# Patient Record
Sex: FEMALE | Race: WHITE | URBAN - METROPOLITAN AREA
[De-identification: names, ages, dates, MRNs, and addresses within clinical notes are randomized per-mention and may not be internally consistent; named-entity substitution may affect disease eponyms.]

---

## 2024-02-05 ENCOUNTER — OFFICE VISIT (OUTPATIENT)
Dept: URGENT CARE | Facility: CLINIC | Age: 64
End: 2024-02-05
Payer: COMMERCIAL

## 2024-02-05 VITALS
OXYGEN SATURATION: 98 % | RESPIRATION RATE: 20 BRPM | WEIGHT: 150 LBS | BODY MASS INDEX: 28.34 KG/M2 | DIASTOLIC BLOOD PRESSURE: 83 MMHG | TEMPERATURE: 97.5 F | SYSTOLIC BLOOD PRESSURE: 154 MMHG | HEART RATE: 92 BPM

## 2024-02-05 DIAGNOSIS — J04.0 LARYNGITIS: ICD-10-CM

## 2024-02-05 DIAGNOSIS — J01.00 ACUTE MAXILLARY SINUSITIS, RECURRENCE NOT SPECIFIED: Primary | ICD-10-CM

## 2024-02-05 PROCEDURE — 99213 OFFICE O/P EST LOW 20 MIN: CPT | Performed by: FAMILY MEDICINE

## 2024-02-05 PROCEDURE — 1036F TOBACCO NON-USER: CPT | Performed by: FAMILY MEDICINE

## 2024-02-05 RX ORDER — CIPROFLOXACIN 500 MG/1
TABLET ORAL
COMMUNITY
Start: 2024-01-26

## 2024-02-05 RX ORDER — GABAPENTIN 300 MG/1
CAPSULE ORAL
COMMUNITY
Start: 2024-01-26

## 2024-02-05 RX ORDER — METHYLPREDNISOLONE 4 MG/1
TABLET ORAL
Qty: 21 TABLET | Refills: 0 | Status: SHIPPED | OUTPATIENT
Start: 2024-02-05

## 2024-02-05 RX ORDER — OXYCODONE AND ACETAMINOPHEN 5; 325 MG/1; MG/1
TABLET ORAL
COMMUNITY
Start: 2024-01-25

## 2024-02-05 RX ORDER — ZOLPIDEM TARTRATE 10 MG/1
10 TABLET ORAL NIGHTLY PRN
COMMUNITY

## 2024-02-05 RX ORDER — AMOXICILLIN 875 MG/1
875 TABLET, FILM COATED ORAL 2 TIMES DAILY
Qty: 14 TABLET | Refills: 0 | Status: SHIPPED | OUTPATIENT
Start: 2024-02-05 | End: 2024-02-12

## 2024-02-05 RX ORDER — ALUMINUM CHLORIDE 20 %
SOLUTION, NON-ORAL TOPICAL
COMMUNITY
Start: 2023-10-24

## 2024-02-05 RX ORDER — LORAZEPAM 2 MG/1
2 TABLET ORAL DAILY PRN
COMMUNITY

## 2024-02-05 RX ORDER — HYDROCODONE BITARTRATE AND ACETAMINOPHEN 5; 325 MG/1; MG/1
TABLET ORAL
COMMUNITY
Start: 2024-01-05

## 2024-02-05 RX ORDER — BUPROPION HYDROCHLORIDE 300 MG/1
TABLET ORAL
COMMUNITY

## 2024-02-05 ASSESSMENT — ENCOUNTER SYMPTOMS
RHINORRHEA: 1
TROUBLE SWALLOWING: 1
NAUSEA: 0
SORE THROAT: 1
SINUS PRESSURE: 1
DIARRHEA: 0
CHILLS: 0
DYSURIA: 0
FEVER: 0
VOMITING: 0
CONSTIPATION: 0
FREQUENCY: 0
ABDOMINAL PAIN: 0
SHORTNESS OF BREATH: 0
CHEST TIGHTNESS: 0
WHEEZING: 0
PALPITATIONS: 0
SINUS COMPLAINT: 1
HEADACHES: 1
COUGH: 1

## 2024-02-06 NOTE — PATIENT INSTRUCTIONS
You have ?? sinusitis. This can be a bacterial or viral infection. Based on your history and the clinical exam I am treating this as a bacterial infection.  Please increase your oral fluids for the next 7-10 days  Please take amoxicillin as prescribed  Please take Medrol as a single dose early in the day with food  May use Mucinex as per label instructions for nasal congestion  You may use nasal saline drops for relief of congestion as needed  You may mix 1 teaspoon of table salt with 8 ounces of warm water to rinse and gargle your sore throat.  You may do this repeatedly for up to 15 minutes if it seems to relieve your discomfort.  Do not swallow this liquid  May use cough lozenges or hard candies to soothe throat and reduce daytime coughing  May take Tylenol (acetaminophen) 325 mg, 2 tablets by mouth every 4-6 hours as needed for fever or discomfort. May take Motrin or Advil (ibuprofen) 200 mg, 2 tablets by mouth every 8 hours as needed for fever   If no improvement in 5-7 days please follow-up with your primary care provider  If fever greater than 102 degrees Fahrenheit, chills, nausea, vomiting, increased redness, tenderness, pain over for head or cheek bone areas, bloody nasal discharge, increased difficulty breathing, difficulty swallowing, increased wheezing, shortness of breath please go immediately to emergency room for further evaluation  This note was generated by voice recognition software. Minor transcription/grammatical errors may be present. Please call for clarification.

## 2024-02-06 NOTE — PROGRESS NOTES
"Subjective   Patient ID: Betty Fair is a 63 y.o. female.    63-year-old  female presents with a 4-day history of respiratory symptoms including nasal congestion and drainage.  She today reports that she has developed a significant laryngitis.  She states it feels like she has got a ball in her throat.  She reports that she takes care of her  granddaughter who as she puts it \"has been sick for the last year\".  Also reports that her son has been ill in the last 10 days.      History provided by:  Patient  Sinus Problem  Severity:  Moderate  Onset quality:  Gradual  Duration:  4 days  Timing:  Constant  Progression:  Worsening  Chronicity:  New  Associated symptoms: congestion, cough, headaches, rhinorrhea and sore throat    Associated symptoms: no abdominal pain, no diarrhea, no ear pain, no fever, no nausea, no shortness of breath, no vomiting and no wheezing        The following portions of the chart were reviewed this encounter and updated as appropriate:  Tobacco  Allergies  Meds  Problems  Med Hx  Surg Hx  Fam Hx         Review of Systems   Constitutional:  Negative for chills and fever.   HENT:  Positive for congestion, rhinorrhea, sinus pressure, sore throat and trouble swallowing. Negative for ear pain.    Respiratory:  Positive for cough. Negative for chest tightness, shortness of breath and wheezing.    Cardiovascular:  Negative for palpitations.   Gastrointestinal:  Negative for abdominal pain, constipation, diarrhea, nausea and vomiting.   Genitourinary:  Negative for dysuria and frequency.   Neurological:  Positive for headaches.     Objective   Physical Exam  Vital signs are reviewed.  Blood pressure elevated at 154/83.    Alert and oriented x3 with normal mood and affect  Patient is well nourished, well-developed, alert and in no acute distress  Tender to palpation over frontal and maxillary sinus areas    External eyes, orbits, conjunctiva and eyelids are normal in " appearance  Pupils are equal, round, reactive to light and accommodation, extraocular movements intact    External ears appear normal  External canals are normal in appearance  Right tympanic membrane is intact and dull pink in appearance  Left tympanic membrane is intact and pale pink in appearance  There is no middle ear effusion noted on the right  There is no middle ear effusion noted on the left  External appearance of the nose is normal  Nasal mucosa, septum, turbinates are moderately reddened and mildly swollen in appearance.  Significant excoriation in both nares.  There is thin yellow nasal discharge in both nares    Oral mucosa is uniformly pink and moist  Palate is pink, symmetric and intact  Tongue is moist, mobile and midline  Tonsils are present, not enlarged, not erythematous with no concretions or exudates present  No cervical lymphadenopathy palpated    Heart has regular rate and rhythm. No murmurs, rubs or gallops are auscultated at this exam.    Respiratory rate rhythm and effort are normal. Breath sounds bilaterally are clear on auscultation without crackles, rhonchi, wheezes or friction rub.    Abdomen: Normal bowel sounds on auscultation. Soft, nontender without rebound or rigidity on palpation          Procedures    Assessment/Plan   Diagnoses and all orders for this visit:  Acute maxillary sinusitis, recurrence not specified  -     methylPREDNISolone (Medrol Dospak) 4 mg tablets; Please take one row of tabs early in the day with food each day  -     amoxicillin (Amoxil) 875 mg tablet; Take 1 tablet (875 mg) by mouth 2 times a day for 7 days.  Laryngitis

## 2024-02-09 ENCOUNTER — OFFICE VISIT (OUTPATIENT)
Dept: URGENT CARE | Facility: CLINIC | Age: 64
End: 2024-02-09
Payer: COMMERCIAL

## 2024-02-09 VITALS
BODY MASS INDEX: 28.34 KG/M2 | SYSTOLIC BLOOD PRESSURE: 154 MMHG | HEART RATE: 61 BPM | DIASTOLIC BLOOD PRESSURE: 88 MMHG | RESPIRATION RATE: 16 BRPM | TEMPERATURE: 97.4 F | OXYGEN SATURATION: 98 % | WEIGHT: 150 LBS

## 2024-02-09 DIAGNOSIS — H65.01 RIGHT ACUTE SEROUS OTITIS MEDIA, RECURRENCE NOT SPECIFIED: Primary | ICD-10-CM

## 2024-02-09 DIAGNOSIS — H69.91 EUSTACHIAN TUBE DYSFUNCTION, RIGHT: ICD-10-CM

## 2024-02-09 DIAGNOSIS — J20.9 ACUTE BRONCHITIS WITH BRONCHOSPASM: ICD-10-CM

## 2024-02-09 DIAGNOSIS — J01.00 ACUTE MAXILLARY SINUSITIS, RECURRENCE NOT SPECIFIED: ICD-10-CM

## 2024-02-09 PROCEDURE — 1036F TOBACCO NON-USER: CPT | Performed by: FAMILY MEDICINE

## 2024-02-09 PROCEDURE — 99214 OFFICE O/P EST MOD 30 MIN: CPT | Performed by: FAMILY MEDICINE

## 2024-02-09 RX ORDER — ALBUTEROL SULFATE 90 UG/1
AEROSOL, METERED RESPIRATORY (INHALATION)
Qty: 6.7 G | Refills: 0 | Status: SHIPPED | OUTPATIENT
Start: 2024-02-09

## 2024-02-09 RX ORDER — INHALER, ASSIST DEVICES
SPACER (EA) MISCELLANEOUS
Qty: 1 EACH | Refills: 0 | Status: SHIPPED | OUTPATIENT
Start: 2024-02-09

## 2024-02-09 RX ORDER — CEFDINIR 300 MG/1
300 CAPSULE ORAL 2 TIMES DAILY
Qty: 20 CAPSULE | Refills: 0 | Status: SHIPPED | OUTPATIENT
Start: 2024-02-09 | End: 2024-02-19

## 2024-02-09 ASSESSMENT — ENCOUNTER SYMPTOMS
FEVER: 0
WHEEZING: 1
PALPITATIONS: 0
FREQUENCY: 0
ABDOMINAL PAIN: 0
NAUSEA: 0
SINUS PRESSURE: 0
DYSURIA: 0
SORE THROAT: 1
RHINORRHEA: 0
SHORTNESS OF BREATH: 0
CHEST TIGHTNESS: 0
CHILLS: 0
DIARRHEA: 0
COUGH: 1
VOMITING: 0
CONSTIPATION: 0
HEADACHES: 1

## 2024-02-09 ASSESSMENT — PAIN SCALES - GENERAL: PAINLEVEL: 8

## 2024-02-09 NOTE — PATIENT INSTRUCTIONS
You have right acute serous otitis media or middle ear infection. You Maxillary sinus infection.  You have right eustachian tube dysfunction.  You have acute bronchitis with bronchospasm  Please increase your oral fluids for the next 7-10 days  Please take Cefdinir as prescribed  I recommend the use of probiotics while taking antibiotic and for an additional 7-10 days after you've completed treatment. Please ask your pharmacist for advice on appropriate product for this purpose  May use Allegra-D or Zyrtec-D for relief of ear congestion.  Ask pharmacist for these medications  If no better in 5-7 days please follow-up with primary care provider.   If fever greater than 102 degrees Fahrenheit, chills, nausea, vomiting, bleeding from ears, drainage from ears, increased difficulty breathing, difficulty swallowing, increased wheezing, shortness of breath please go immediately to emergency room for further evaluation.      You have an upper respiratory infection with cough and acute bronchitis with bronchospasm  Use inhaler with spacer device 2 puffs every 4-6 hours for the next 7-10 days, decrease frequency of use as symptoms improve. Please take 5-6 deep breaths after activating inhaler from spacer chamber. Pause for approximately 1-2 minutes.  Repeat activation of inhaler and 5-6 deep breaths from spacer.  Increase oral fluids for the next 7-10 days  May use ibuprofen 200mg, 2 tabs by mouth every 8 hours with food as needed for discomfort or fever.  May take Tylenol 325 mg 2 tablets by mouth every 4-6 hours as needed for discomfort or fever  If no improvement in 5-7 days follow-up with primary care provider  If fever greater than 102 degrees Fahrenheit, chills, nausea, vomiting,  increased difficulty breathing, difficulty swallowing,  increased shortness of breath, worsening wheezing go to emergency department for further evaluation  This note was generated by voice recognition software. Minor transcription/grammatical  errors may be present. Please call for clarification.

## 2024-02-09 NOTE — PROGRESS NOTES
Subjective   Patient ID: Betty Fair is a 63 y.o. female.    63-year-old  female returns to urgent care with complaint that she does not feel like medications prescribed on Monday have helped as she is still having a sore throat and sinus pressure and today additionally complains of pain in her ears.  Patient was treated on Monday for sinus infection.  As well as symptoms of bronchitis and cough.      Earache   Associated symptoms include coughing, headaches and a sore throat. Pertinent negatives include no abdominal pain, diarrhea, rhinorrhea or vomiting.   Sore Throat   Associated symptoms include coughing, ear pain and headaches. Pertinent negatives include no abdominal pain, congestion, diarrhea, shortness of breath or vomiting.   Sinusitis  Associated symptoms: cough, ear pain, headaches, sore throat and wheezing    Associated symptoms: no chills, no congestion, no fever, no nausea, no rhinorrhea, no shortness of breath and no vomiting        The following portions of the chart were reviewed this encounter and updated as appropriate:  Tobacco  Allergies  Meds  Problems  Med Hx  Surg Hx  Fam Hx         Review of Systems   Constitutional:  Negative for chills and fever.   HENT:  Positive for ear pain and sore throat. Negative for congestion, rhinorrhea and sinus pressure.    Respiratory:  Positive for cough and wheezing. Negative for chest tightness and shortness of breath.    Cardiovascular:  Negative for palpitations.   Gastrointestinal:  Negative for abdominal pain, constipation, diarrhea, nausea and vomiting.   Genitourinary:  Negative for dysuria and frequency.   Neurological:  Positive for headaches.     Objective   Physical Exam  Vital signs are reviewed.  Blood pressure elevated at 154/88.  Alert and oriented x3 with normal mood and affect  Patient is well nourished, well-developed, alert and in no acute distress  Complains of pain on palpation over frontal and maxillary sinus  areas    External eyes, orbits, conjunctiva and eyelids are normal in appearance  Pupils are equal, round, reactive to light and accommodation, extraocular movements intact    External ears appear normal  External canals are normal in appearance  Right tympanic membrane is intact and dull pink in appearance  Left tympanic membrane is intact and dull gray in appearance  There is cloudy white middle ear effusion noted on the right  There is cloudy white ear effusion noted on the left  External appearance of the nose is normal  Nasal mucosa, septum, turbinates are mildly reddened and mildly swollen in appearance  There is thin yellow nasal discharge in both nares    Oral mucosa is uniformly pink and moist  Palate is pink, symmetric and intact  Tongue is moist, mobile and midline  Posterior pharynx mildly erythematous with no concretions or exudates present  No cervical lymphadenopathy palpated    Heart has regular rate and rhythm. No murmurs, rubs or gallops are auscultated at this exam.    Respiratory rate rhythm and effort are normal. Breath sounds bilaterally are slightly coarse on auscultation without crackles, rhonchi or friction rub.  Possible end expiratory wheezing    Abdomen: Normal bowel sounds on auscultation. Soft, nontender without rebound or rigidity on palpation    Patient reports no improvement after 3 days of antibiotic will change agents on concern that she may have failed antibiotic therapy secondary to resistant organism.      Procedures    Assessment/Plan   Diagnoses and all orders for this visit:  Right acute serous otitis media, recurrence not specified  -     cefdinir (Omnicef) 300 mg capsule; Take 1 capsule (300 mg) by mouth 2 times a day for 10 days.  Acute maxillary sinusitis, recurrence not specified  -     cefdinir (Omnicef) 300 mg capsule; Take 1 capsule (300 mg) by mouth 2 times a day for 10 days.  Acute bronchitis with bronchospasm  -     albuterol (Proventil HFA) 90 mcg/actuation  inhaler; Take 1 puff via spacer,  take 5-6 easy breaths.  Rest 2 minutes.  Repeat x1.  May use every 4-6 hours as needed  -     inhalational spacing device (Aerochamber MV) inhaler; Use as instructed  Eustachian tube dysfunction, right

## 2025-02-11 ENCOUNTER — OFFICE VISIT (OUTPATIENT)
Dept: URGENT CARE | Age: 65
End: 2025-02-11
Payer: MEDICARE

## 2025-02-11 VITALS
TEMPERATURE: 98.8 F | BODY MASS INDEX: 26.43 KG/M2 | OXYGEN SATURATION: 99 % | DIASTOLIC BLOOD PRESSURE: 98 MMHG | HEART RATE: 65 BPM | WEIGHT: 140 LBS | RESPIRATION RATE: 18 BRPM | SYSTOLIC BLOOD PRESSURE: 160 MMHG | HEIGHT: 61 IN

## 2025-02-11 DIAGNOSIS — J01.90 ACUTE SINUSITIS, RECURRENCE NOT SPECIFIED, UNSPECIFIED LOCATION: Primary | ICD-10-CM

## 2025-02-11 RX ORDER — AMOXICILLIN 875 MG/1
875 TABLET, FILM COATED ORAL 2 TIMES DAILY
Qty: 20 TABLET | Refills: 0 | Status: SHIPPED | OUTPATIENT
Start: 2025-02-11 | End: 2025-02-21

## 2025-02-11 ASSESSMENT — ENCOUNTER SYMPTOMS
ENDOCRINE NEGATIVE: 1
ALLERGIC/IMMUNOLOGIC NEGATIVE: 1
SINUS COMPLAINT: 1
NEUROLOGICAL NEGATIVE: 1
PSYCHIATRIC NEGATIVE: 1
FEVER: 0
OCCASIONAL FEELINGS OF UNSTEADINESS: 0
CARDIOVASCULAR NEGATIVE: 1
MUSCULOSKELETAL NEGATIVE: 1
GASTROINTESTINAL NEGATIVE: 1
HEMATOLOGIC/LYMPHATIC NEGATIVE: 1
EYES NEGATIVE: 1
COUGH: 0
SORE THROAT: 0

## 2025-02-11 ASSESSMENT — PATIENT HEALTH QUESTIONNAIRE - PHQ9
2. FEELING DOWN, DEPRESSED OR HOPELESS: NOT AT ALL
1. LITTLE INTEREST OR PLEASURE IN DOING THINGS: NOT AT ALL
SUM OF ALL RESPONSES TO PHQ9 QUESTIONS 1 AND 2: 0

## 2025-02-11 NOTE — PROGRESS NOTES
"Subjective   Patient ID: Betty Fair is a 64 y.o. female. They present today with a chief complaint of Sinus Problem (Sinus pain and pressure, popping in sinuses, cough, sore throat x 14 days ).    History of Present Illness    History provided by:  Patient   used: No    Sinus Problem  Associated symptoms: congestion    Associated symptoms: no cough, no fever and no sore throat      This is a 64 yr old female here for sinus sxs. Bloody nasal drainage. Facial pain and pressure, and ears \"plugged up\" x 2 weeks. No sore throat, fever or cough.  Past Medical History  Allergies as of 02/11/2025 - Reviewed 02/11/2025   Allergen Reaction Noted    Shellfish containing products Other 02/05/2024       (Not in a hospital admission)       Past Medical History:   Diagnosis Date    Anxiety disorder, unspecified     Anxiety and depression       History reviewed. No pertinent surgical history.     reports that she has quit smoking. Her smoking use included cigarettes. She has never used smokeless tobacco.    Review of Systems  Review of Systems   Constitutional:  Negative for fever.   HENT:  Positive for congestion. Negative for sore throat.    Eyes: Negative.    Respiratory:  Negative for cough.    Cardiovascular: Negative.    Gastrointestinal: Negative.    Endocrine: Negative.    Genitourinary: Negative.    Musculoskeletal: Negative.    Skin: Negative.    Allergic/Immunologic: Negative.    Neurological: Negative.    Hematological: Negative.    Psychiatric/Behavioral: Negative.     All other systems reviewed and are negative.         Objective    Vitals:    02/11/25 1637   BP: (!) 160/98   Pulse: 65   Resp: 18   Temp: 37.1 °C (98.8 °F)   TempSrc: Oral   SpO2: 99%   Weight: 63.5 kg (140 lb)   Height: 1.549 m (5' 1\")     No LMP recorded (lmp unknown). Patient is postmenopausal.    Physical Exam  Vitals and nursing note reviewed.   Constitutional:       Appearance: Normal appearance.   HENT:      Head: " Normocephalic and atraumatic.      Right Ear: Tympanic membrane and ear canal normal.      Left Ear: Tympanic membrane and ear canal normal.      Mouth/Throat:      Mouth: Mucous membranes are moist.      Pharynx: Oropharynx is clear.   Cardiovascular:      Rate and Rhythm: Normal rate and regular rhythm.   Pulmonary:      Effort: Pulmonary effort is normal.      Breath sounds: Normal breath sounds.   Musculoskeletal:      Cervical back: Neck supple.   Lymphadenopathy:      Cervical: No cervical adenopathy.   Skin:     General: Skin is warm and dry.   Neurological:      General: No focal deficit present.      Mental Status: She is alert and oriented to person, place, and time.   Psychiatric:         Mood and Affect: Mood normal.         Behavior: Behavior normal.         Procedures    Point of Care Test & Imaging Results from this visit  No results found for this visit on 02/11/25.   No results found.    Diagnostic study results (if any) were reviewed by Jing Giordano PA-C.    Assessment/Plan   Allergies, medications, history, and pertinent labs/EKGs/Imaging reviewed by Jing Giordano PA-C.     Orders and Diagnoses  Diagnoses and all orders for this visit:  Acute sinusitis, recurrence not specified, unspecified location  -     amoxicillin (Amoxil) 875 mg tablet; Take 1 tablet (875 mg) by mouth 2 times a day for 10 days.    Plan:  Med as above  OTC decongestant as directed  Increase clear fluids  Pcp follow up this week if not improving or worsening  ER visit anytime 24/7 for acute worsening or changing condition    Patient disposition: Home    Electronically signed by Jing Giordano PA-C  5:09 PM